# Patient Record
Sex: MALE
[De-identification: names, ages, dates, MRNs, and addresses within clinical notes are randomized per-mention and may not be internally consistent; named-entity substitution may affect disease eponyms.]

---

## 2018-01-10 ENCOUNTER — HOSPITAL ENCOUNTER (EMERGENCY)
Dept: HOSPITAL 31 - C.ER | Age: 48
Discharge: HOME | End: 2018-01-10
Payer: MEDICAID

## 2018-01-10 VITALS
TEMPERATURE: 98 F | OXYGEN SATURATION: 99 % | DIASTOLIC BLOOD PRESSURE: 79 MMHG | HEART RATE: 60 BPM | RESPIRATION RATE: 18 BRPM | SYSTOLIC BLOOD PRESSURE: 125 MMHG

## 2018-01-10 DIAGNOSIS — K41.20: Primary | ICD-10-CM

## 2018-01-10 DIAGNOSIS — K40.20: ICD-10-CM

## 2018-01-10 LAB
BACTERIA #/AREA URNS HPF: (no result) /[HPF]
BILIRUB UR-MCNC: NEGATIVE MG/DL
GLUCOSE UR STRIP-MCNC: NORMAL MG/DL
LEUKOCYTE ESTERASE UR-ACNC: (no result) LEU/UL
PH UR STRIP: 6 [PH] (ref 5–8)
PROT UR STRIP-MCNC: NEGATIVE MG/DL
RBC # UR STRIP: NEGATIVE /UL
SP GR UR STRIP: 1.02 (ref 1–1.03)
URINE NITRATE: NEGATIVE
UROBILINOGEN UR-MCNC: NORMAL MG/DL (ref 0.2–1)

## 2018-01-10 NOTE — C.PDOC
Time Seen by Provider: 01/10/18 16:05


Chief Complaint (Nursing): Male Genitourinary





Past Medical History


Vital Signs: 





 Last Vital Signs











Temp  98 F   01/10/18 15:30


 


Pulse  60   01/10/18 15:30


 


Resp  18   01/10/18 15:30


 


BP  125/79   01/10/18 15:30


 


Pulse Ox  99   01/10/18 15:30














- Medical History


PMH: Asthma


Family History: States: Unknown Family Hx





- Social History


Hx Alcohol Use: No


Hx Substance Use: No





- Immunization History


Hx Tetanus Toxoid Vaccination: No


Hx Influenza Vaccination: No


Hx Pneumococcal Vaccination: No





ED Course And Treatment


O2 Sat by Pulse Oximetry: 99





Medical Decision Making


Medical Decision Making: 





today's eval c/w 2 prior eval's for mild b/l femoral and inguinal hernias, non-

protruding.


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 

bags of garbage)





AGAIN referred to Gen Surg as needed for further eval and PRN surgical 

correction





Disposition


Doctor Will See Patient In The: Office


Counseled Patient/Family Regarding: Studies Performed, Diagnosis





- Disposition


Disposition: HOME/ ROUTINE


Disposition Time: 16:31


Condition: GOOD





- Clinical Impression


Clinical Impression: 


 Femoral hernia, Inguinal hernia

## 2018-09-18 ENCOUNTER — HOSPITAL ENCOUNTER (OUTPATIENT)
Dept: HOSPITAL 14 - H.ENDO | Age: 48
Discharge: HOME | End: 2018-09-18
Attending: INTERNAL MEDICINE
Payer: MEDICAID

## 2018-09-18 VITALS
OXYGEN SATURATION: 100 % | TEMPERATURE: 97.1 F | SYSTOLIC BLOOD PRESSURE: 90 MMHG | DIASTOLIC BLOOD PRESSURE: 66 MMHG | RESPIRATION RATE: 18 BRPM

## 2018-09-18 VITALS — BODY MASS INDEX: 30.7 KG/M2

## 2018-09-18 VITALS — HEART RATE: 55 BPM

## 2018-09-18 DIAGNOSIS — K64.0: ICD-10-CM

## 2018-09-18 DIAGNOSIS — Z12.11: Primary | ICD-10-CM

## 2018-09-18 DIAGNOSIS — E78.5: ICD-10-CM

## 2018-09-18 PROCEDURE — 45378 DIAGNOSTIC COLONOSCOPY: CPT

## 2019-04-01 ENCOUNTER — HOSPITAL ENCOUNTER (EMERGENCY)
Dept: HOSPITAL 31 - C.ER | Age: 49
Discharge: HOME | End: 2019-04-01
Payer: COMMERCIAL

## 2019-04-01 VITALS
OXYGEN SATURATION: 99 % | RESPIRATION RATE: 18 BRPM | DIASTOLIC BLOOD PRESSURE: 79 MMHG | TEMPERATURE: 98.9 F | SYSTOLIC BLOOD PRESSURE: 135 MMHG | HEART RATE: 79 BPM

## 2019-04-01 VITALS — BODY MASS INDEX: 30.7 KG/M2
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
## 2019-04-01 DIAGNOSIS — Z23: ICD-10-CM

## 2019-04-01 DIAGNOSIS — S80.812A: Primary | ICD-10-CM

## 2019-04-01 DIAGNOSIS — E78.00: ICD-10-CM

## 2019-04-01 NOTE — C.PDOC
History Of Present Illness


47 y/o male presents to the ED for evaluation s/p dog bite. Patient states he 

sustained a bite to his left shin from the family [place] two days ago. He 

notes slight swelling to the area and presents to the ED for a tetanus shot. He 

denies extremity numbness/weakness, fever, chills. 


Time Seen by Provider: 04/01/19 13:15


Chief Complaint (Nursing): Bite


History Per: Patient


History/Exam Limitations: no limitations


Onset/Duration Of Symptoms: Days (2)


Current Symptoms Are (Timing): Still Present





Past Medical History


Reviewed: Historical Data, Nursing Documentation, Vital Signs


Vital Signs: 





                                Last Vital Signs











Temp  98.9 F   04/01/19 12:20


 


Pulse  79   04/01/19 12:20


 


Resp  18   04/01/19 12:20


 


BP  135/79   04/01/19 12:20


 


Pulse Ox  99   04/01/19 12:20














- Medical History


PMH: Asthma, Hypercholesterolemia


   Denies: Chronic Kidney Disease


Surgical History: No Surg Hx


Family History: States: Unknown Family Hx





- Social History


Hx Alcohol Use: No


Hx Substance Use: No





- Immunization History


Hx Tetanus Toxoid Vaccination: No


Hx Influenza Vaccination: No


Hx Pneumococcal Vaccination: No





Review Of Systems


Constitutional: Negative for: Fever, Chills


Skin: Positive for: Other (dog bite left shin )


Neurological: Negative for: Weakness, Numbness





Physical Exam





- Physical Exam


Appears: Non-toxic, No Acute Distress


Skin: Normal Color, Warm, Dry, Other (redness, swelling and abrasion from teeth 

to left shin. no active bleeding )


Extremity: Normal ROM


Neurological/Psych: Normal Speech, Normal Cognition, Normal Sensation





ED Course And Treatment


O2 Sat by Pulse Oximetry: 99 (on RA)


Pulse Ox Interpretation: Normal





Medical Decision Making


Medical Decision Making: 





Tetanus administered. 


Patient is stable for discharge with wound care and f/u instructions. 








Disposition


Counseled Patient/Family Regarding: Diagnosis, Need For Followup





- Disposition


Referrals: 


[facility] [Outside]


Disposition: HOME/ ROUTINE


Disposition Time: 13:16


Condition: STABLE


Additional Instructions: 


[facility] [address] 


Prescriptions: 


Amoxicillin/Clavulanate [Augmentin 875 MG-125 MG] 1 tab PO BID #28 tab


Instructions:  Animal Bites (DC)


Forms:  Gen Discharge Inst [place], [facility] ([place]), Work Excuse





- POA


Present On Arrival: None





- Clinical Impression


Clinical Impression: 


 Animal bite wound








- Scribe Statement


The provider has reviewed the documentation as recorded by the Scribe (Fabiola Barney)


Provider Attestation: 








All medical record entries made by the Scribe were at my direction and 

personally dictated by me. I have reviewed the chart and agree that the record 

accurately reflects my personal performance of the history, physical exam, 

medical decision making, and the department course for this patient. I have also

personally directed, reviewed, and agree with the discharge instructions and 

disposition.